# Patient Record
Sex: FEMALE | Race: BLACK OR AFRICAN AMERICAN | NOT HISPANIC OR LATINO | URBAN - METROPOLITAN AREA
[De-identification: names, ages, dates, MRNs, and addresses within clinical notes are randomized per-mention and may not be internally consistent; named-entity substitution may affect disease eponyms.]

---

## 2020-08-07 ENCOUNTER — EMERGENCY (EMERGENCY)
Facility: HOSPITAL | Age: 45
LOS: 1 days | Discharge: HOME | End: 2020-08-07
Attending: EMERGENCY MEDICINE | Admitting: EMERGENCY MEDICINE
Payer: MEDICAID

## 2020-08-07 VITALS
TEMPERATURE: 99 F | SYSTOLIC BLOOD PRESSURE: 136 MMHG | DIASTOLIC BLOOD PRESSURE: 78 MMHG | WEIGHT: 134.92 LBS | OXYGEN SATURATION: 98 % | HEIGHT: 60 IN | HEART RATE: 88 BPM | RESPIRATION RATE: 16 BRPM

## 2020-08-07 VITALS
HEART RATE: 72 BPM | DIASTOLIC BLOOD PRESSURE: 80 MMHG | OXYGEN SATURATION: 99 % | SYSTOLIC BLOOD PRESSURE: 124 MMHG | TEMPERATURE: 99 F | RESPIRATION RATE: 16 BRPM

## 2020-08-07 LAB
HCG UR QL: NEGATIVE — SIGNIFICANT CHANGE UP
S PYO DNA THROAT QL NAA+PROBE: SIGNIFICANT CHANGE UP

## 2020-08-07 PROCEDURE — 70360 X-RAY EXAM OF NECK: CPT | Mod: 26

## 2020-08-07 PROCEDURE — 81025 URINE PREGNANCY TEST: CPT

## 2020-08-07 PROCEDURE — 99283 EMERGENCY DEPT VISIT LOW MDM: CPT

## 2020-08-07 PROCEDURE — 87798 DETECT AGENT NOS DNA AMP: CPT

## 2020-08-07 PROCEDURE — 87651 STREP A DNA AMP PROBE: CPT

## 2020-08-07 PROCEDURE — 99283 EMERGENCY DEPT VISIT LOW MDM: CPT | Mod: 25

## 2020-08-07 PROCEDURE — 70360 X-RAY EXAM OF NECK: CPT

## 2020-08-07 RX ORDER — EMTRICITABINE AND TENOFOVIR DISOPROXIL FUMARATE 200; 300 MG/1; MG/1
1 TABLET, FILM COATED ORAL
Qty: 0 | Refills: 0 | DISCHARGE

## 2020-08-07 RX ORDER — ATAZANAVIR AND COBICISTAT 300; 150 MG/1; MG/1
1 TABLET ORAL
Qty: 0 | Refills: 0 | DISCHARGE

## 2020-08-07 NOTE — ED ADULT NURSE NOTE - OBJECTIVE STATEMENT
Amb to ED. Pt c/o "something stuck in my throat for past week"  Thinks it might be a fishbone. O/E no SOB or difficulty swallowing- no drooling. Throat slightly reddened. No tonsil swelling or exudate.

## 2020-08-07 NOTE — ED PROVIDER NOTE - LAB INTERPRETATION
Strep Throat by PCR (08.07.20 @ 15:33)    Strep Throat by PCR: NotDetec: Strep Throat PCR assay is a Real Time PCR test for the qualitative  detection and differentiation of Group A Streptococcus on throat swabs.  The result should be interpreted with consideration of all clinical and  laboratory findings.

## 2020-08-07 NOTE — ED PROVIDER NOTE - ENMT, MLM
Airway patent, Nasal mucosa clear. Mouth with normal mucosa. Throat has no vesicles, no oropharyngeal exudates and uvula is midline. +handling secretions without difficulty, no trismus, mild pharyngeal erythema, no tonsillar hypertrophy or exudates Airway patent, Mouth with normal mucosa. Throat has no vesicles,  uvula is midline. +handling secretions without difficulty, no trismus, mild pharyngeal erythema, no tonsillar hypertrophy or exudates Airway patent, Mouth with normal mucosa. Throat has no vesicles,  uvula is midline. +handling secretions without difficulty, no trismus, mild pharyngeal erythema, no tonsillar hypertrophy or exudates. moderate amount of post nasal drop. no tenderness of thyroid cartilage

## 2020-08-07 NOTE — ED PROVIDER NOTE - NSFOLLOWUPCLINICS_GEN_ALL_ED_FT
[facility] - ENT  Otolaryngology (ENT)  [address] [place] [address]  Phone: [phone]  Fax:   Follow Up Time: 1-3 Days

## 2020-08-07 NOTE — ED PROVIDER NOTE - NSFOLLOWUPINSTRUCTIONS_ED_ALL_ED_FT
symptoms may be related to post nasal drip (recommend salt water gargles, antihistamine over the counter such as zyrtec and nasal steroid over the counter such as flonase)  symptoms may be due to scratched throat or small foreign body not visualized. recommend close follow up with ENT. referrals provided  return to ED immediately for any problems swallowing or breathing

## 2020-08-07 NOTE — ED PROVIDER NOTE - CARE PROVIDER_API CALL
John Garcia  OTOLARYNGOLOGY  [address]  [place] [address]  Phone: [phone]  Fax: [phone]  Follow Up Time: 1-3 Days

## 2020-08-07 NOTE — ED PROVIDER NOTE - OBJECTIVE STATEMENT
44 year old female with a hx of HIV presents to the ED for foreign body sensation in throat for the past week, pt states that she was eating fish and noticed some discomfort after eating, did not notice discomfort while she was eating. Pt states she is able to swallow water and eat food without difficulty. States pain is a 8/10. Denies fever, chills or body aches. Denies other symptoms or complaints, Pt unsure of CD4 counts. PCP: "forgot name, is in [place]"

## 2020-08-07 NOTE — ED PROVIDER NOTE - CLINICAL SUMMARY MEDICAL DECISION MAKING FREE TEXT BOX
Pt presents with foreign body sensation in throat for the past week, no problem swallowing or stridor, differentials include but are not limited to foreign body, post nasal drip, strep pharyngitis, no posterior cervical lymph nodes to suggest mononucleosis., Do not suspect other bronchitis. Will swab for strep, XR soft tissue, neck and ent follow up. Pt presents with foreign body sensation in throat for the past week, no problem swallowing or stridor, differentials include but are not limited to foreign body, post nasal drip, strep pharyngitis, no posterior cervical lymph nodes to suggest mononucleosis., Do not suspect epiglottitis. Will swab for strep, XR soft tissue, neck and ENT follow up.

## 2020-08-07 NOTE — ED PROVIDER NOTE - PROGRESS NOTE DETAILS
Matthew AS for Dr. Quinones: 45 y/o female with a PMHx of HTN presents to the ED for throat discomfort for the past week. Pt believes that a fish bone might be stuck in her throat. Pt states that she ate some fish a week ago, did not notice any discomfort while she was eating but states later in the day noticed the discomfort in her throat. Denies N/V, fevers, difficulty breathing. Pt feels the discomfort in the back of her throat. Has been able to swallow, drink water and eat. Does not feel like the discomfort has been getting better. Describes the discomfort as a 9/10. Does not have a GI or ENT specialist to follow up with. PE: Vitals signs normal, afebrile, no distress, minor erythema of posterior pharynx no foreign body, no stridor, lungs clear. Impression is possible foreign body in pharynx, plan is rapid strep and soft tissue XR of the neck, if negative will refer to ENT for possible endoscopic evaluation. Reevaluated patient at bedside.  no diff swallowing or resp distress. able to eat and drink in ED without difficulty.  Discussed the results of all diagnostic testing in ED and copies of all reports given. discussed possible FB vs scratched throat. has been present over a week. cannot recall specific incident of FB ingestion. no pain while eating. discussed pain may be related to post nasal drip due to moderate amount of post nasal drip on exam. recommend close follow up with ENT in office, referral provided. will return immediately for any worsening pain or symptoms.  An opportunity to ask questions was given.  Discussed the importance of prompt, close medical follow-up.  Patient will return with any changes, concerns or persistent / worsening symptoms.  Understanding of all instructions verbalized.

## 2020-08-10 PROBLEM — Z00.00 ENCOUNTER FOR PREVENTIVE HEALTH EXAMINATION: Status: ACTIVE | Noted: 2020-08-10

## 2020-08-11 PROBLEM — B20 HUMAN IMMUNODEFICIENCY VIRUS [HIV] DISEASE: Chronic | Status: ACTIVE | Noted: 2020-08-07

## 2020-08-31 ENCOUNTER — APPOINTMENT (OUTPATIENT)
Dept: OTOLARYNGOLOGY | Facility: CLINIC | Age: 45
End: 2020-08-31

## 2023-02-28 NOTE — ED ADULT NURSE NOTE - NSFALLRSKASSESSDT_ED_ALL_ED
[facility] - Periop Services  Surgery Department  Operative Note    SUMMARY     Date of Procedure: 2/28/2023     Procedure: Procedure(s) (LRB):  LIGATION, FALLOPIAN TUBE, LAPAROSCOPIC (Bilateral)  DILATION AND CURETTAGE, UTERUS  ABLATION, ENDOMETRIUM     Surgeon(s) and Role:     * Abdiel Ding, DO - Primary    Assisting Surgeon: None    Pre-Operative Diagnosis: Request for sterilization [Z30.2]    Post-Operative Diagnosis: Post-Op Diagnosis Codes:     * Request for sterilization [Z30.2]    Anesthesia: General    Operative Findings (including complications, if any): anteverted uterus sounding to 8cm, normal appearing right tube and bilateral ovaries, left tube attenuated and hypoplastic     Description of Technical Procedures:     The patient was taken to the operating room with IV fluids running. Pneumatic compression stockings were placed on the lower extremities and turned on prior to the beginning of the procedure. General anesthesia was obtained without difficulty. A bimanual exam revealed an anteverted uterus. The bladder was drained with catheter. The patient was then prepped and draped in the dorsal lithotomy position with Mike-type stirrups.    A time-out was then performed with Dr. Ding present. A weighted speculum was placed into the vagina. The anterior lip of the cervix was then grasped with a single-tooth tenaculum. The uterus was gently sounded to 8cm. The cervix was sequentially dilated with Hegar dilators. A uterine manipulator was introduced.    An vertical infraumbilical skin incision was then made within the umbilical fold. A 5 mm trocar was then placed into the abdomen under direct visualization using the Optiview technique. The tissues and structures below the entry site were free of damage from entry. Intraabdominal placement  was confirmed with the Laparoscope.  Pneumoperitoneum was established with carbon dioxide gas to a pressure of approximately 15 mmHg. The uterus  was elevated from the pelvis using a uterine manipulator. Perforation was noted at the fundus bilaterally. Intraabdominal survey revealed the above findings. The Trendelenburg position was then used to facilitate moving the bowel and omentum out of the pelvis.    A second and third 5 mm trocar was inserted into the abdomen under direct visualization in RLQ and LLQ, respectively. An atraumatic grasper was used to grasp the right fallopian tube.  The fimbriae of the fallopian tube was elevated. The Voyant was used to perform the salpingectomy and excellent hemostasis was noted. The ovarian tissue and infundibulopelvic ligament were free of damage. The same procedure was then performed on the opposite side. Excellent hemostasis was noted. The bovie rollerball was introduced into the abdomen to help cauterize uterine perforations. Floseal and interceed were placed over the site. Insufflation was then released.    Attention was then turned below. A weighted speculum and right angle retractor were placed into the vagina. The uterine manipulator was then removed from the uterus. The anterior lip of the cervix was grasped with an Allis.     A gentle curettage was then performed using a medium-sized curette rotating on the outward fashion. Endometrial curettings were then sent to Pathology.    Next, the Kylee device was opened. A cavity assessment was obtained and failed likely due to uterine perforation. Attempted seal two additional times. Decision made to abort attempt at endometrial ablation.    Next, the Allis was removed from the anterior lip of the cervix and excellent hemostasis was noted at the cervical lip. All the instruments were removed from the vagina.    The port sites were removed under direct visualization and the laparoscope was removed from the abdomen. The insufflation was released and the infraumbilical port site was removed.    The laparoscopic port sites were closed using 4-0 monocryl suture in  interrupted fashion. Of note, local anesthesia was injected into the port sites prior to incision.    All of the instruments were removed from the abdomen and the vagina, and all of the counts were correct x2. The patient tolerated the procedure well. The patient was then taken to the recovery room in a stable condition.        Significant Surgical Tasks Conducted by the Assistant(s), if Applicable: NA    Estimated Blood Loss (EBL): 50 mL           Implants: * No implants in log *    Specimens:   Specimen (24h ago, onward)       Start     Ordered    02/28/23 1101  Surgical Pathology  RELEASE UPON ORDERING         02/28/23 1101                            Condition: Good    Disposition: PACU - hemodynamically stable.    Attestation: I was present and scrubbed for the entire procedure.   07-Aug-2020 13:45